# Patient Record
Sex: MALE | Race: WHITE | NOT HISPANIC OR LATINO | Employment: FULL TIME | URBAN - METROPOLITAN AREA
[De-identification: names, ages, dates, MRNs, and addresses within clinical notes are randomized per-mention and may not be internally consistent; named-entity substitution may affect disease eponyms.]

---

## 2022-05-30 ENCOUNTER — TELEPHONE (OUTPATIENT)
Dept: OTOLARYNGOLOGY | Facility: CLINIC | Age: 27
End: 2022-05-30

## 2022-05-30 ENCOUNTER — OFFICE VISIT (OUTPATIENT)
Dept: OTOLARYNGOLOGY | Facility: CLINIC | Age: 27
End: 2022-05-30
Payer: COMMERCIAL

## 2022-05-30 VITALS
SYSTOLIC BLOOD PRESSURE: 128 MMHG | HEART RATE: 72 BPM | TEMPERATURE: 98 F | DIASTOLIC BLOOD PRESSURE: 97 MMHG | WEIGHT: 270.94 LBS

## 2022-05-30 DIAGNOSIS — J35.01 CHRONIC TONSILLITIS: Primary | ICD-10-CM

## 2022-05-30 PROCEDURE — 3074F SYST BP LT 130 MM HG: CPT | Mod: CPTII,S$GLB,, | Performed by: OTOLARYNGOLOGY

## 2022-05-30 PROCEDURE — 99203 OFFICE O/P NEW LOW 30 MIN: CPT | Mod: S$GLB,,, | Performed by: OTOLARYNGOLOGY

## 2022-05-30 PROCEDURE — 99999 PR PBB SHADOW E&M-NEW PATIENT-LVL III: ICD-10-PCS | Mod: PBBFAC,,, | Performed by: OTOLARYNGOLOGY

## 2022-05-30 PROCEDURE — 3080F DIAST BP >= 90 MM HG: CPT | Mod: CPTII,S$GLB,, | Performed by: OTOLARYNGOLOGY

## 2022-05-30 PROCEDURE — 1160F PR REVIEW ALL MEDS BY PRESCRIBER/CLIN PHARMACIST DOCUMENTED: ICD-10-PCS | Mod: CPTII,S$GLB,, | Performed by: OTOLARYNGOLOGY

## 2022-05-30 PROCEDURE — 3074F PR MOST RECENT SYSTOLIC BLOOD PRESSURE < 130 MM HG: ICD-10-PCS | Mod: CPTII,S$GLB,, | Performed by: OTOLARYNGOLOGY

## 2022-05-30 PROCEDURE — 99999 PR PBB SHADOW E&M-NEW PATIENT-LVL III: CPT | Mod: PBBFAC,,, | Performed by: OTOLARYNGOLOGY

## 2022-05-30 PROCEDURE — 99203 PR OFFICE/OUTPT VISIT, NEW, LEVL III, 30-44 MIN: ICD-10-PCS | Mod: S$GLB,,, | Performed by: OTOLARYNGOLOGY

## 2022-05-30 PROCEDURE — 1159F PR MEDICATION LIST DOCUMENTED IN MEDICAL RECORD: ICD-10-PCS | Mod: CPTII,S$GLB,, | Performed by: OTOLARYNGOLOGY

## 2022-05-30 PROCEDURE — 1160F RVW MEDS BY RX/DR IN RCRD: CPT | Mod: CPTII,S$GLB,, | Performed by: OTOLARYNGOLOGY

## 2022-05-30 PROCEDURE — 1159F MED LIST DOCD IN RCRD: CPT | Mod: CPTII,S$GLB,, | Performed by: OTOLARYNGOLOGY

## 2022-05-30 PROCEDURE — 3080F PR MOST RECENT DIASTOLIC BLOOD PRESSURE >= 90 MM HG: ICD-10-PCS | Mod: CPTII,S$GLB,, | Performed by: OTOLARYNGOLOGY

## 2022-05-30 RX ORDER — AMOXICILLIN AND CLAVULANATE POTASSIUM 875; 125 MG/1; MG/1
1 TABLET, FILM COATED ORAL 2 TIMES DAILY
Qty: 20 TABLET | Refills: 0 | Status: SHIPPED | OUTPATIENT
Start: 2022-05-30 | End: 2022-06-09

## 2022-05-30 RX ORDER — AMOXICILLIN AND CLAVULANATE POTASSIUM 875; 125 MG/1; MG/1
1 TABLET, FILM COATED ORAL 2 TIMES DAILY
Qty: 20 TABLET | Refills: 0 | Status: SHIPPED | OUTPATIENT
Start: 2022-05-30 | End: 2022-05-30

## 2022-05-30 RX ORDER — CETIRIZINE HYDROCHLORIDE 10 MG/1
10 TABLET ORAL DAILY
COMMUNITY

## 2022-05-30 NOTE — PROGRESS NOTES
Referring Provider:    Aaareferral Self  No address on file  Subjective:   Patient: Stephan Oropeza [id number], :1995   Visit date:2022 7:44 AM    Chief Complaint:  Sore Throat (Pt states he has swollen/red tonsils with white spots, 10 days on amoxicillin and symptoms came back, round of azithromycin and steroid pack and still sore)    HPI:    Prior notes reviewed by myself.  Clinical documentation obtained by nursing staff reviewed.     27 y/o gentleman with complaints of chronic sore throat, dysphagia, odynophagia.  He states that he was seen an urgent care roughly 3 weeks ago and treated with amoxicillin steroids for 10 days.  He had some interval improvement with a decrease in erythema and exudate but then after finishing the antibiotics his symptoms worsened.  He was seen again at the urgent care and placed on azithromycin for 1 week.  He had some improvement but continues to feel as if his tonsils are swollen and has a sore throat.  He was strep negative at this 2nd visit.  He has only had 1 episode of tonsillitis in past.  Distant history of mononucleosis.       Objective:     Physical Exam:  Vitals:  BP (!) 128/97   Pulse 72   Temp 98.1 °F (36.7 °C) (Temporal)   Wt 122.9 kg (270 lb 15.1 oz)   General appearance:  Well developed, well nourished    Ears:  Otoscopy of external auditory canals and tympanic membranes was normal, clinical speech reception thresholds grossly intact, no mass/lesion of auricle.    Nose:  No masses/lesions of external nose, nasal mucosa, septum, and turbinates were within normal limits.    Mouth:  No mass/lesion of lips, teeth, gums, hard/soft palate, tongue.  3+ cryptic tonsils with mild erythema, no exudate, no evidence of PTA.    Neck & Lymphatics:  No cervical lymphadenopathy, no neck mass/crepitus/ asymmetry, trachea is midline, no thyroid enlargement/tenderness/mass.        [x]  Data Reviewed:    No results found for: WBC, HGB, HCT, MCV, LABPLAT,  EOSINOPHIL            Assessment & Plan:   Chronic tonsillitis  -     amoxicillin-clavulanate 875-125mg (AUGMENTIN) 875-125 mg per tablet; Take 1 tablet by mouth 2 (two) times daily. for 10 days  Dispense: 20 tablet; Refill: 0        He does have a crowded oropharynx with enlarged tonsils and mild erythema.  He continues to have symptoms that are different from his baseline.  We agreed to treat once more with Augmentin and he is going to follow up p.r.n. if his symptoms do not completely resolve.  He understands and agrees with the plan.

## 2022-05-30 NOTE — TELEPHONE ENCOUNTER
Spoke with pt and informed him that Augmentin rx was sent to [facility]. Pt verbalized understanding.

## 2022-05-30 NOTE — TELEPHONE ENCOUNTER
----- Message from Gus Hall sent at 5/30/2022 12:53 PM CDT -----  Contact: PT  Calling to speak with the office in regards to his appt today. The pharmacy is closed today. Call back at [phone]

## 2022-05-30 NOTE — TELEPHONE ENCOUNTER
----- Message from Zelda Gallo sent at 5/30/2022  8:39 AM CDT -----  Contact: Sandip Oropeza is needing a call back regarding the pharmacy he originally had his Augmentin sent to being closed. He'd like it sent to another one. Please call him back at [phone]      Please send it to:   [facility]-  [id number] [address], [place] [address]  Phone Number: [phone]